# Patient Record
Sex: MALE | Race: WHITE | NOT HISPANIC OR LATINO | Employment: FULL TIME | URBAN - METROPOLITAN AREA
[De-identification: names, ages, dates, MRNs, and addresses within clinical notes are randomized per-mention and may not be internally consistent; named-entity substitution may affect disease eponyms.]

---

## 2018-12-18 ENCOUNTER — TRANSFERRED RECORDS (OUTPATIENT)
Dept: HEALTH INFORMATION MANAGEMENT | Facility: CLINIC | Age: 23
End: 2018-12-18

## 2019-09-05 ENCOUNTER — TRANSFERRED RECORDS (OUTPATIENT)
Dept: HEALTH INFORMATION MANAGEMENT | Facility: CLINIC | Age: 24
End: 2019-09-05

## 2019-10-03 ENCOUNTER — TRANSFERRED RECORDS (OUTPATIENT)
Dept: HEALTH INFORMATION MANAGEMENT | Facility: CLINIC | Age: 24
End: 2019-10-03

## 2019-10-14 ENCOUNTER — MEDICAL CORRESPONDENCE (OUTPATIENT)
Dept: HEALTH INFORMATION MANAGEMENT | Facility: CLINIC | Age: 24
End: 2019-10-14

## 2019-10-16 NOTE — TELEPHONE ENCOUNTER
MEDICAL RECORDS REQUEST   [facility]  Urology Clinic  [address] [place] [address]  PHONE: [phone]  Fax: [phone]        FUTURE VISIT INFORMATION                                                   SABRINA Tellez: 1995 scheduled for future visit at [facility]    APPOINTMENT INFORMATION:    Date: 10/30/19 2:30PM     Provider:  Janessa Roach RN    Reason for Visit/Diagnosis: ED issues     REFERRAL INFORMATION:    Referring provider:  Jimmy Virk     Specialty: MD    Referring providers clinic:  [facility]     Clinic contact number:  [phone]    RECORDS REQUESTED FOR VISIT                                                     NOTES  STATUS/DETAILS   OFFICE NOTE from referring provider  yes   OFFICE NOTE from other specialist  yes   DISCHARGE SUMMARY from hospital  no   DISCHARGE REPORT from the ER  no   OPERATIVE REPORT  no   MEDICATION LIST  yes     PRE-VISIT CHECKLIST      Record collection complete YES- [facility] recs scanned in epic       If no, please explain:  Fax to [facility] to obtain recs- 10/16  Fax to [facility] to obtain recs- 10/24    Appointment appropriately scheduled           (right time/right provider) Yes   MyChart activation If no, please explain: In process    Questionnaire complete If no, please explain: In process      Completed by: Nancy Valencia

## 2019-10-23 ENCOUNTER — PRE VISIT (OUTPATIENT)
Dept: UROLOGY | Facility: CLINIC | Age: 24
End: 2019-10-23

## 2019-10-23 NOTE — TELEPHONE ENCOUNTER
Reason for Visit: Consult    Diagnosis: ED    Orders/Procedures/Records: Incoming records    Contact Patient: N/A    Rooming Requirements: Normal      Leticia Villa  10/23/19  8:36 AM

## 2019-10-24 ENCOUNTER — TRANSFERRED RECORDS (OUTPATIENT)
Dept: HEALTH INFORMATION MANAGEMENT | Facility: CLINIC | Age: 24
End: 2019-10-24

## 2019-10-29 NOTE — PROGRESS NOTES
Chief Complaint:   ED         History of Present Illness:    Esteban Xie is a pleasant 24 year old male who presents for evaluation of the above. Per chart review, he was previously seen in [place] for bothersome urinary frequency and nocturia. He underwent cystoscopy to rule out stricture, which was structurally normal. Today, he states that these voiding symptoms have completely resolved after completing pelvic floor training, including Kegels.     In terms of erectile function, he states that he has always had issues achieving and maintaining erections. He was prescribed Viagra by his provider in [place], and he tried this several times, up to 100 mg, without any discernable effect. He has tried to have intercourse twice in the past, but lost his erection, and was unable to continue. He has since avoided intimate relationships out of concern that this would happen again. Of note, he does currently take Lexapro, but is not obese and does not smoke.          Past Medical History:     Past Medical History:   Diagnosis Date     Epididymitis, right      Swelling, mass, or lump in head and neck             Past Surgical History:     Past Surgical History:   Procedure Laterality Date     CYSTOSCOPY              Medications     Current Outpatient Medications   Medication     escitalopram (LEXAPRO) 10 MG tablet     No current facility-administered medications for this visit.             Family History:   History reviewed. No pertinent family history.         Social History:     Social History     Socioeconomic History     Marital status: Single     Spouse name: Not on file     Number of children: Not on file     Years of education: Not on file     Highest education level: Not on file   Occupational History     Not on file   Social Needs     Financial resource strain: Not on file     Food insecurity:     Worry: Not on file     Inability: Not on file     Transportation needs:     Medical: Not on file      "Non-medical: Not on file   Tobacco Use     Smoking status: Never Smoker     Smokeless tobacco: Never Used   Substance and Sexual Activity     Alcohol use: Not Currently     Drug use: Never     Sexual activity: Never   Lifestyle     Physical activity:     Days per week: Not on file     Minutes per session: Not on file     Stress: Not on file   Relationships     Social connections:     Talks on phone: Not on file     Gets together: Not on file     Attends Anabaptism service: Not on file     Active member of club or organization: Not on file     Attends meetings of clubs or organizations: Not on file     Relationship status: Not on file     Intimate partner violence:     Fear of current or ex partner: Not on file     Emotionally abused: Not on file     Physically abused: Not on file     Forced sexual activity: Not on file   Other Topics Concern     Parent/sibling w/ CABG, MI or angioplasty before 65F 55M? No   Social History Narrative     Not on file            Allergies:   Latex and Penicillins         Review of Systems:  From intake questionnaire   Negative 14 system review except as noted on HPI, nurse's note.         Physical Exam:   Patient is a 24 year old  male   Vitals: Blood pressure 118/79, pulse 68, height 1.803 m (5' 11\"), weight 74.8 kg (165 lb).  General Appearance Adult: Alert, no acute distress, oriented  Lungs: no respiratory distress, or pursed lip breathing  Heart: No obvious jugular venous distension present  Abdomen: soft, nontender, no organomegaly or masses, Body mass index is 23.01 kg/m .  : deferred      Labs and Pathology:    I personally reviewed all applicable laboratory data and went over findings with patient        Imaging:    I personally reviewed all applicable imaging and went over findings with patient.         Assessment and Plan:     Assessment: 24 year old male with reported long-standing erectile dysfunction, including poor erectile quality and early loss of erections. He has " tried the max dose of Viagra without discernable effect. We discussed continued monitoring with stress management vs. further evaluation with imaging, and he is interested in pursuing further evaluation.      Plan:  -Will obtain penile duplex ultrasound to evaluate for venous leak.  -Follow up with Dr. Chowdary following this scan on the same day to review the results and discuss next steps.      Janessa Roach, CNP  Department of Urology

## 2019-10-30 ENCOUNTER — OFFICE VISIT (OUTPATIENT)
Dept: UROLOGY | Facility: CLINIC | Age: 24
End: 2019-10-30
Payer: COMMERCIAL

## 2019-10-30 ENCOUNTER — PRE VISIT (OUTPATIENT)
Dept: UROLOGY | Facility: CLINIC | Age: 24
End: 2019-10-30

## 2019-10-30 VITALS
WEIGHT: 165 LBS | SYSTOLIC BLOOD PRESSURE: 118 MMHG | BODY MASS INDEX: 23.1 KG/M2 | DIASTOLIC BLOOD PRESSURE: 79 MMHG | HEIGHT: 71 IN | HEART RATE: 68 BPM

## 2019-10-30 DIAGNOSIS — N52.9 ERECTILE DYSFUNCTION, UNSPECIFIED ERECTILE DYSFUNCTION TYPE: Primary | ICD-10-CM

## 2019-10-30 RX ORDER — ESCITALOPRAM OXALATE 10 MG/1
10 TABLET ORAL DAILY
Refills: 1 | COMMUNITY
Start: 2019-10-11

## 2019-10-30 ASSESSMENT — MIFFLIN-ST. JEOR: SCORE: 1760.57

## 2019-10-30 ASSESSMENT — PAIN SCALES - GENERAL: PAINLEVEL: NO PAIN (0)

## 2019-10-30 NOTE — PATIENT INSTRUCTIONS
UROLOGY CLINIC VISIT PATIENT INSTRUCTIONS    1) We will obtain a duplex ultrasound scan of your penile vessels.   2) Schedule a follow up visit after this scan is complete (same day) with Dr. Chowdary to review the results and discuss next steps.    If you have any issues, questions or concerns in the meantime, do not hesitate to contact us at [phone] or via [employer].     It was a pleasure meeting with you today.  Thank you for allowing me and my team the privilege of caring for you today.  YOU are the reason we are here, and I truly hope we provided you with the excellent service you deserve.  Please let us know if there is anything else we can do for you so that we can be sure you are leaving completely satisfied with your care experience.    Janessa Roach, CNP

## 2019-10-30 NOTE — LETTER
10/30/2019       RE: Esteban Xie  [address] [address]     Dear Colleague,    Thank you for referring your patient, Esteban iXe, to the [facility] UROLOGY AND INST FOR PROSTATE AND UROLOGIC CANCERS at [facility]. Please see a copy of my visit note below.            Chief Complaint:   ED         History of Present Illness:    Esteban Xie is a pleasant 24 year old male who presents for evaluation of the above. Per chart review, he was previously seen in [place] for bothersome urinary frequency and nocturia. He underwent cystoscopy to rule out stricture, which was structurally normal. Today, he states that these voiding symptoms have completely resolved after completing pelvic floor training, including Kegels.     In terms of erectile function, he states that he has always had issues achieving and maintaining erections. He was prescribed Viagra by his provider in [place], and he tried this several times, up to 100 mg, without any discernable effect. He has tried to have intercourse twice in the past, but lost his erection, and was unable to continue. He has since avoided intimate relationships out of concern that this would happen again. Of note, he does currently take Lexapro, but is not obese and does not smoke.          Past Medical History:     Past Medical History:   Diagnosis Date     Epididymitis, right      Swelling, mass, or lump in head and neck             Past Surgical History:     Past Surgical History:   Procedure Laterality Date     CYSTOSCOPY              Medications     Current Outpatient Medications   Medication     escitalopram (LEXAPRO) 10 MG tablet     No current facility-administered medications for this visit.             Family History:   History reviewed. No pertinent family history.         Social History:     Social History     Socioeconomic History     Marital status: Single     Spouse name: Not on file     Number of children: Not on  "file     Years of education: Not on file     Highest education level: Not on file   Occupational History     Not on file   Social Needs     Financial resource strain: Not on file     Food insecurity:     Worry: Not on file     Inability: Not on file     Transportation needs:     Medical: Not on file     Non-medical: Not on file   Tobacco Use     Smoking status: Never Smoker     Smokeless tobacco: Never Used   Substance and Sexual Activity     Alcohol use: Not Currently     Drug use: Never     Sexual activity: Never   Lifestyle     Physical activity:     Days per week: Not on file     Minutes per session: Not on file     Stress: Not on file   Relationships     Social connections:     Talks on phone: Not on file     Gets together: Not on file     Attends Scientology service: Not on file     Active member of club or organization: Not on file     Attends meetings of clubs or organizations: Not on file     Relationship status: Not on file     Intimate partner violence:     Fear of current or ex partner: Not on file     Emotionally abused: Not on file     Physically abused: Not on file     Forced sexual activity: Not on file   Other Topics Concern     Parent/sibling w/ CABG, MI or angioplasty before 65F 55M? No   Social History Narrative     Not on file            Allergies:   Latex and Penicillins         Review of Systems:  From intake questionnaire   Negative 14 system review except as noted on HPI, nurse's note.         Physical Exam:   Patient is a 24 year old  male   Vitals: Blood pressure 118/79, pulse 68, height 1.803 m (5' 11\"), weight 74.8 kg (165 lb).  General Appearance Adult: Alert, no acute distress, oriented  Lungs: no respiratory distress, or pursed lip breathing  Heart: No obvious jugular venous distension present  Abdomen: soft, nontender, no organomegaly or masses, Body mass index is 23.01 kg/m .  : deferred      Labs and Pathology:    I personally reviewed all applicable laboratory data and went over " findings with patient        Imaging:    I personally reviewed all applicable imaging and went over findings with patient.         Assessment and Plan:     Assessment: 24 year old male with reported long-standing erectile dysfunction, including poor erectile quality and early loss of erections. He has tried the max dose of Viagra without discernable effect. We discussed continued monitoring with stress management vs. further evaluation with imaging, and he is interested in pursuing further evaluation.      Plan:  -Will obtain penile duplex ultrasound to evaluate for venous leak.  -Follow up with Dr. Chowdary following this scan on the same day to review the results and discuss next steps.      Janessa Roach, CNP  Department of Urology

## 2019-10-30 NOTE — NURSING NOTE
"Chief Complaint   Patient presents with     Consult     ED       Blood pressure 118/79, pulse 68, height 1.803 m (5' 11\"), weight 74.8 kg (165 lb). Body mass index is 23.01 kg/m .    There is no problem list on file for this patient.      Allergies   Allergen Reactions     Latex Rash     Penicillins Hives     Converted from Generic Allergy: Penicillin       Current Outpatient Medications   Medication Sig Dispense Refill     escitalopram (LEXAPRO) 10 MG tablet Take 10 mg by mouth daily  1       Social History     Tobacco Use     Smoking status: Never Smoker     Smokeless tobacco: Never Used   Substance Use Topics     Alcohol use: Not Currently     Drug use: Never       Leticia Villa, EMT  10/30/2019  2:22 PM     "

## 2019-10-31 ENCOUNTER — ANCILLARY PROCEDURE (OUTPATIENT)
Dept: ULTRASOUND IMAGING | Facility: CLINIC | Age: 24
End: 2019-10-31
Attending: NURSE PRACTITIONER
Payer: COMMERCIAL

## 2019-10-31 ENCOUNTER — OFFICE VISIT (OUTPATIENT)
Dept: UROLOGY | Facility: CLINIC | Age: 24
End: 2019-10-31
Payer: COMMERCIAL

## 2019-10-31 VITALS
SYSTOLIC BLOOD PRESSURE: 118 MMHG | HEIGHT: 71 IN | DIASTOLIC BLOOD PRESSURE: 76 MMHG | BODY MASS INDEX: 23.1 KG/M2 | HEART RATE: 96 BPM | WEIGHT: 165 LBS

## 2019-10-31 DIAGNOSIS — N52.9 ERECTILE DYSFUNCTION, UNSPECIFIED ERECTILE DYSFUNCTION TYPE: Primary | ICD-10-CM

## 2019-10-31 DIAGNOSIS — N52.9 ERECTILE DYSFUNCTION, UNSPECIFIED ERECTILE DYSFUNCTION TYPE: ICD-10-CM

## 2019-10-31 ASSESSMENT — MIFFLIN-ST. JEOR: SCORE: 1760.57

## 2019-10-31 ASSESSMENT — PAIN SCALES - GENERAL: PAINLEVEL: NO PAIN (0)

## 2019-10-31 NOTE — PATIENT INSTRUCTIONS
Please schedule a repeat penile ultrasound with 20 mcg on a Thursday AM.  (Please send message to Lubna Castellon RN about date and time)        It was a pleasure meeting with you today.  Thank you for allowing me and my team the privilege of caring for you today.  YOU are the reason we are here, and I truly hope we provided you with the excellent service you deserve.  Please let us know if there is anything else we can do for you so that we can be sure you are leaving completely satisfied with your care experience.

## 2019-10-31 NOTE — LETTER
10/31/2019       RE: Esteban Xie  [address] [place] [address]     Dear Colleague,    Thank you for referring your patient, Esteban Xie, to the [facility] UROLOGY AND INST FOR PROSTATE AND UROLOGIC CANCERS at [facility]. Please see a copy of my visit note below.      Esteban is a very nice 24-year-old male here in urology follow-up.  He is a patient of Janessa in this clinic.    He underwent a penile duplex ultrasound today, October 31, 2019.  He had 10 mcg alprostadil injected for the study.  He has a history of erectile dysfunction.  He reports chronic issues achieving and maintaining erections.  He has tried Viagra, up to 100 mg, without any positive effects.  He has attempted intercourse twice in his life, but was night unable to complete the act both times due to losing the erection.  He has since avoided intimate relationships out of concern that this would happen again.  He does not have significant arterial disease risk factors such as hypertension, hyperlipidemia, smoking or diabetes.    He had the ultrasound today.  This demonstrates an immediate increase in the flow flow through bilateral cavernosal arteries, but a fairly immediate drop off of the flow.  To me, this looks like a little too small of a dosage.  He appears to hopefully have normal arteries, because he did have good immediate response to the alprostadil, but the effect was very transient, and he only received a 1-2 out of 10 firmness of erection.    I discussed the options with Esteban- he would like to repeat the test with a higher dose of alprostadil.  We will have him do a 20 mcg dose, repeat the duplex ultrasound and see me right after.      Likely the main treatment options for him would be injections or possible surgical options, but it would be nice to avoid surgical options if not absolutely necessary at this young age.      20 min visit, over 50% face to face in counseling/discussion of  above issues. We reviewed penile duplex ultrasound images together and I will forward him the formal report when available.      Sigifredo ECHEVERRIA

## 2019-10-31 NOTE — NURSING NOTE
"Chief Complaint   Patient presents with     RECHECK     duplex ultrasound scan of your penile vessels.        Blood pressure 118/76, pulse 96, height 1.803 m (5' 11\"), weight 74.8 kg (165 lb). Body mass index is 23.01 kg/m .    There is no problem list on file for this patient.      Allergies   Allergen Reactions     Latex Rash     Penicillins Hives     Converted from Generic Allergy: Penicillin       Current Outpatient Medications   Medication Sig Dispense Refill     escitalopram (LEXAPRO) 10 MG tablet Take 10 mg by mouth daily  1       Social History     Tobacco Use     Smoking status: Never Smoker     Smokeless tobacco: Never Used   Substance Use Topics     Alcohol use: Not Currently     Drug use: Never       Verenice Stone LPN  10/31/2019  9:24 AM  "

## 2019-11-05 NOTE — PROGRESS NOTES
Esteban is a very nice 24-year-old male here in urology follow-up.  He is a patient of Janessa in this clinic.    He underwent a penile duplex ultrasound today, October 31, 2019.  He had 10 mcg alprostadil injected for the study.  He has a history of erectile dysfunction.  He reports chronic issues achieving and maintaining erections.  He has tried Viagra, up to 100 mg, without any positive effects.  He has attempted intercourse twice in his life, but was night unable to complete the act both times due to losing the erection.  He has since avoided intimate relationships out of concern that this would happen again.  He does not have significant arterial disease risk factors such as hypertension, hyperlipidemia, smoking or diabetes.    He had the ultrasound today.  This demonstrates an immediate increase in the flow flow through bilateral cavernosal arteries, but a fairly immediate drop off of the flow.  To me, this looks like a little too small of a dosage.  He appears to hopefully have normal arteries, because he did have good immediate response to the alprostadil, but the effect was very transient, and he only received a 1-2 out of 10 firmness of erection.    I discussed the options with Esteban- he would like to repeat the test with a higher dose of alprostadil.  We will have him do a 20 mcg dose, repeat the duplex ultrasound and see me right after.      Likely the main treatment options for him would be injections or possible surgical options, but it would be nice to avoid surgical options if not absolutely necessary at this young age.      20 min visit, over 50% face to face in counseling/discussion of above issues. We reviewed penile duplex ultrasound images together and I will forward him the formal report when available.      Sigifredo ECHEVERRIA

## 2019-11-11 ENCOUNTER — TELEPHONE (OUTPATIENT)
Dept: UROLOGY | Facility: CLINIC | Age: 24
End: 2019-11-11

## 2019-11-11 NOTE — TELEPHONE ENCOUNTER
[facility] Call Center    Phone Message    May a detailed message be left on voicemail: yes    Reason for Call: Other: Lisha from the imaging department called because the lead ultrasound tech said that the pt needs a follow up with the urologist directly following his upcoming penile ultrasound appointment. Currently, there are no appointments that soon with Dr. Chowdary. Please give Lisha a call back to discuss availability and if the appointment is indeed needed right after the ultrasound.      Action Taken: Message routed to:  [facility] ([facility] Urology

## 2019-11-14 ENCOUNTER — ANCILLARY PROCEDURE (OUTPATIENT)
Dept: ULTRASOUND IMAGING | Facility: CLINIC | Age: 24
End: 2019-11-14
Attending: UROLOGY
Payer: COMMERCIAL

## 2019-11-14 ENCOUNTER — OFFICE VISIT (OUTPATIENT)
Dept: UROLOGY | Facility: CLINIC | Age: 24
End: 2019-11-14
Payer: COMMERCIAL

## 2019-11-14 VITALS
WEIGHT: 165 LBS | HEART RATE: 98 BPM | SYSTOLIC BLOOD PRESSURE: 121 MMHG | HEIGHT: 71 IN | BODY MASS INDEX: 23.1 KG/M2 | DIASTOLIC BLOOD PRESSURE: 71 MMHG

## 2019-11-14 DIAGNOSIS — N52.9 ED (ERECTILE DYSFUNCTION): Primary | ICD-10-CM

## 2019-11-14 DIAGNOSIS — N52.9 ERECTILE DYSFUNCTION, UNSPECIFIED ERECTILE DYSFUNCTION TYPE: ICD-10-CM

## 2019-11-14 DIAGNOSIS — N52.02 CORPORO-VENOUS OCCLUSIVE ERECTILE DYSFUNCTION: ICD-10-CM

## 2019-11-14 RX ORDER — DIVALPROEX SODIUM 500 MG/1
500 TABLET, EXTENDED RELEASE ORAL DAILY
Refills: 5 | COMMUNITY
Start: 2019-11-01

## 2019-11-14 ASSESSMENT — PAIN SCALES - GENERAL: PAINLEVEL: NO PAIN (0)

## 2019-11-14 ASSESSMENT — MIFFLIN-ST. JEOR: SCORE: 1760.57

## 2019-11-14 NOTE — LETTER
11/14/2019       RE: Esteban Xie  [address] [address]     Dear Colleague,    Thank you for referring your patient, Esteban Xie, to the [facility] UROLOGY AND INST FOR PROSTATE AND UROLOGIC CANCERS at [facility]. Please see a copy of my visit note below.      Esteban is a very nice 24-year-old male here in urology follow-up.  He is a patient of Janessa in this clinic.    He underwent a penile duplex ultrasound today, November 18, 2019.   He had 20 mcg alprostadil injected for the study.  Previous study was done a couple weeks ago with a 10 mcg dose, which showed an unimpressive arterial response.   He is here to repeat the study with a higher dose of medication.      He has a history of erectile dysfunction.  He reports chronic issues achieving and maintaining erections.  He has tried Viagra, up to 100 mg, without any positive effects.  He has attempted intercourse twice in his life, but was night unable to complete the act both times due to losing the erection.  He has since avoided intimate relationships out of concern that this would happen again.  He does not have significant arterial disease risk factors such as hypertension, hyperlipidemia, smoking or diabetes.    He had the ultrasound today.  This demonstrates an immediate increase in the flow flow through bilateral cavernosal arteries, and a more robust response with excellent arterial inflow bilaterally.  He appears to have normal arteries, because he did have good immediate response to the alprostadil 20 mcg.  Despite the good arterial dilation and increase in arterial flow rate, he did not achieve an erection.  These results are consistent with venoocclusive dysfunction.     Likely the main treatment options for him would be injections or possible surgical options, but it would be nice to avoid surgical options if not absolutely necessary at this young age.  We discussed he could also consider a  venous constriction band, and I gave him a couple examples of things to try.    I would be happy to see him back as needed.      20 min visit, over 50% face to face in counseling/discussion of above issues. We reviewed penile duplex ultrasound images together and I will forward him the formal report when available.      Sigifredo ECHEVERRIA     Again, thank you for allowing me to participate in the care of your patient.      Sincerely,    Ilir Chowdary MD

## 2019-11-14 NOTE — PROGRESS NOTES
Esteban is a very nice 24-year-old male here in urology follow-up.  He is a patient of Janessa in this clinic.    He underwent a penile duplex ultrasound today, November 18, 2019.   He had 20 mcg alprostadil injected for the study.  Previous study was done a couple weeks ago with a 10 mcg dose, which showed an unimpressive arterial response.   He is here to repeat the study with a higher dose of medication.      He has a history of erectile dysfunction.  He reports chronic issues achieving and maintaining erections.  He has tried Viagra, up to 100 mg, without any positive effects.  He has attempted intercourse twice in his life, but was night unable to complete the act both times due to losing the erection.  He has since avoided intimate relationships out of concern that this would happen again.  He does not have significant arterial disease risk factors such as hypertension, hyperlipidemia, smoking or diabetes.    He had the ultrasound today.  This demonstrates an immediate increase in the flow flow through bilateral cavernosal arteries, and a more robust response with excellent arterial inflow bilaterally.  He appears to have normal arteries, because he did have good immediate response to the alprostadil 20 mcg.  Despite the good arterial dilation and increase in arterial flow rate, he did not achieve an erection.  These results are consistent with venoocclusive dysfunction.     Likely the main treatment options for him would be injections or possible surgical options, but it would be nice to avoid surgical options if not absolutely necessary at this young age.  We discussed he could also consider a venous constriction band, and I gave him a couple examples of things to try.    I would be happy to see him back as needed.      20 min visit, over 50% face to face in counseling/discussion of above issues. We reviewed penile duplex ultrasound images together and I will forward him the formal report when  available.      Sigifredo ECHEVERRIA

## 2019-11-14 NOTE — NURSING NOTE
"Chief Complaint   Patient presents with     Follow Up     review US       Blood pressure 121/71, pulse 98, height 1.803 m (5' 11\"), weight 74.8 kg (165 lb). Body mass index is 23.01 kg/m .    There is no problem list on file for this patient.      Allergies   Allergen Reactions     Latex Rash     Penicillins Hives     Converted from Generic Allergy: Penicillin       Current Outpatient Medications   Medication Sig Dispense Refill     alprostadil (EDEX) 20 MCG kit 20 mcg by Intracavitary route once for 1 dose use no more than 3 times per week 20 mcg 0     divalproex sodium extended-release (DEPAKOTE ER) 500 MG 24 hr tablet Take 500 mg by mouth daily  5     escitalopram (LEXAPRO) 10 MG tablet Take 10 mg by mouth daily  1       Social History     Tobacco Use     Smoking status: Never Smoker     Smokeless tobacco: Never Used   Substance Use Topics     Alcohol use: Not Currently     Drug use: Never       Verenice Stone LPN  11/14/2019  11:03 AM  "

## 2019-11-15 NOTE — NURSING NOTE
The following medication was given: edex 10 mcg  Injected patient's right side penis with 10 mcg of edex. Patient tolerated well. He will come up to see Dr Chowdary once done.   MEDICATION:  Edex  ROUTE: cavitary  SITE: penile   DOSE: 10 mcg  LOT #: [id number]  : [employer]  EXPIRATION DATE: 4/2021  NDC#    Was there drug waste? No      Lubna Castellon RN  November 15, 2019

## 2019-11-21 ENCOUNTER — TELEPHONE (OUTPATIENT)
Dept: UROLOGY | Facility: CLINIC | Age: 24
End: 2019-11-21

## 2019-11-21 NOTE — TELEPHONE ENCOUNTER
[facility] Call Center    Phone Message    May a detailed message be left on voicemail: yes    Reason for Call: Other: The patient was told to call the clinic back after making a decision on the surgical implant he wants to know what the next steps will be please call patient to address concerns thank you       Action Taken: Message routed to:  [facility] URO

## 2019-11-25 NOTE — TELEPHONE ENCOUNTER
[facility] Call Center    Phone Message    May a detailed message be left on voicemail: yes    Reason for Call: Other: Per call from PT is returning calls to [place]. PT can be reached at the above #. Attempted to contact via [employer].      Action Taken: Message routed to:  [facility] ([facility] Urology

## 2019-11-25 NOTE — TELEPHONE ENCOUNTER
Rommel Chowdary,      Patient states that he spoke to his insurance and they informed him the the injections are not covered. Patient states that he would like to have surgery. He states that he is aware of his age and he wants to continue to have surgery instead of the injections.      Brandin Lopez MA

## 2019-11-27 NOTE — TELEPHONE ENCOUNTER
Patient notified that Dr. Ilir Chowdary would like for the patient to have a second opinion done before the patient and he decide on implant surgery. Dr. Chowdary thinks the surgery would work well for him, but Dr. Chowdary is wary of the lifetime risks in someone so young. Dr. Chowdary want to exhaust any other possible options.      Dr. Chowdary recommends the patient see Kalyan Vizcaino at [facility] or Dr. Abhilash Waters at [place].      Brandin Lopez MA

## 2019-12-04 NOTE — RESULT ENCOUNTER NOTE
Please notify pt of these results:      Repeat penile duplex ultrasound shows essentially normal penile arteries.    - thanks.  CIERA

## 2019-12-23 NOTE — NURSING NOTE
The following medication was given:     MEDICATION:  Edex  ROUTE: copora  SITE: penis  DOSE: 20 mcg  LOT #: [id number]  : [employer]  EXPIRATION DATE: 12/30/2023  NDC#: 73633-397-39   Was there drug waste? No  Multi-dose vial: No    Lubna Castellon RN  December 23, 2019  Patient tolerated well sent to Dr Chowdary to evaluate  Lubna Castellon RN   Care Coordinator Urology